# Patient Record
Sex: MALE | Race: WHITE
[De-identification: names, ages, dates, MRNs, and addresses within clinical notes are randomized per-mention and may not be internally consistent; named-entity substitution may affect disease eponyms.]

---

## 2020-04-09 ENCOUNTER — HOSPITAL ENCOUNTER (OUTPATIENT)
Dept: HOSPITAL 53 - M LRY | Age: 40
End: 2020-04-09
Attending: NURSE PRACTITIONER
Payer: COMMERCIAL

## 2020-04-09 ENCOUNTER — HOSPITAL ENCOUNTER (OUTPATIENT)
Dept: HOSPITAL 53 - M SFHCLERA | Age: 40
End: 2020-04-09
Attending: NURSE PRACTITIONER
Payer: COMMERCIAL

## 2020-04-09 DIAGNOSIS — R68.89: Primary | ICD-10-CM

## 2020-04-09 PROCEDURE — 87804 INFLUENZA ASSAY W/OPTIC: CPT

## 2020-04-09 PROCEDURE — 87880 STREP A ASSAY W/OPTIC: CPT

## 2020-04-09 PROCEDURE — 71046 X-RAY EXAM CHEST 2 VIEWS: CPT

## 2020-04-09 NOTE — REP
CHEST, TWO VIEWS:

 

Two views of the chest are performed.

 

There are no prior studies.

 

FINDINGS:

 

There is dense peripheral infiltrate in the right middle lobe. The left lung is

clear. Heart is normal in size. Mediastinal silhouette is unremarkable.

Visualized osseous structures are unremarkable.

 

IMPRESSION:

 

Dense peripheral infiltrate right middle lobe.

 

 

Electronically Signed by

Beto Miller MD 04/10/2020 09:30 A

## 2021-09-06 PROCEDURE — U0003 INFECTIOUS AGENT DETECTION BY NUCLEIC ACID (DNA OR RNA); SEVERE ACUTE RESPIRATORY SYNDROME CORONAVIRUS 2 (SARS-COV-2) (CORONAVIRUS DISEASE [COVID-19]), AMPLIFIED PROBE TECHNIQUE, MAKING USE OF HIGH THROUGHPUT TECHNOLOGIES AS DESCRIBED BY CMS-2020-01-R: HCPCS | Performed by: EMERGENCY MEDICINE

## 2021-09-07 ENCOUNTER — TELEPHONE (OUTPATIENT)
Dept: URGENT CARE | Facility: CLINIC | Age: 41
End: 2021-09-07

## 2021-09-08 ENCOUNTER — TELEPHONE (OUTPATIENT)
Dept: URGENT CARE | Facility: CLINIC | Age: 41
End: 2021-09-08

## 2021-12-21 ENCOUNTER — OFFICE VISIT (OUTPATIENT)
Dept: ORTHOPEDIC SURGERY | Facility: CLINIC | Age: 41
End: 2021-12-21

## 2021-12-21 VITALS — BODY MASS INDEX: 25.18 KG/M2 | WEIGHT: 190 LBS | HEIGHT: 73 IN

## 2021-12-21 DIAGNOSIS — M25.511 RIGHT SHOULDER PAIN, UNSPECIFIED CHRONICITY: Primary | ICD-10-CM

## 2021-12-21 DIAGNOSIS — Z98.890 HISTORY OF ARTHROSCOPY OF RIGHT SHOULDER: ICD-10-CM

## 2021-12-21 PROCEDURE — 99203 OFFICE O/P NEW LOW 30 MIN: CPT | Performed by: ORTHOPAEDIC SURGERY

## 2021-12-21 NOTE — PROGRESS NOTES
"Chief Complaint  Pain of the Right Shoulder     Subjective      Teddy Bonilla presents to [facility] for evaluation of the right shoulder. The patient reports he had a surgery to his right shoulder 1 year ago. He has been attending physical therapy. He has pain and decreased ROM. He reports he had a right shoulder arthroscopy in [place] 12/2021 on his rotator cuff and biceps. He reports a throbbing pain to the anterior shoulder and pain that radiates down his arm. He had a previous surgery in 1999 to his right elbow as well. He has no other complaints.     No Known Allergies     Social History     Socioeconomic History   • Marital status:    Tobacco Use   • Smoking status: Never Smoker   • Smokeless tobacco: Never Used   Vaping Use   • Vaping Use: Never used   Substance and Sexual Activity   • Alcohol use: Yes     Comment: occ   • Drug use: Never        Review of Systems     Objective   Vital Signs:   Ht 185.4 cm (73\")   Wt 86.2 kg (190 lb)   BMI 25.07 kg/m²       Physical Exam  Constitutional:       Appearance: Normal appearance. The patient is well-developed and normal weight.   HENT:      Head: Normocephalic.      Right Ear: Hearing and external ear normal.      Left Ear: Hearing and external ear normal.      Nose: Nose normal.   Eyes:      Conjunctiva/sclera: Conjunctivae normal.   Cardiovascular:      Rate and Rhythm: Normal rate.   Pulmonary:      Effort: Pulmonary effort is normal.      Breath sounds: No wheezing or rales.   Abdominal:      Palpations: Abdomen is soft.      Tenderness: There is no abdominal tenderness.   Musculoskeletal:      Cervical back: Normal range of motion.   Skin:     Findings: No rash.   Neurological:      Mental Status: The patient is alert and oriented to person, place, and time.   Psychiatric:         Mood and Affect: Mood and affect normal.         Judgment: Judgment normal.       Ortho Exam      Right shoulder- well healed arthroscopy " scars and scar in the axillary shoulder from biceps tenodesis. Forward elevation active 165 with pain. Abduction 145. External Rotation 65. Internal rotation 45. 4+ supraspinatus strength. Positive impingement signs. 4+ infraspinatus. 5/5 subscapularis. Internal rotation to T-12. Negative lift off.     Procedures    X-Ray Report:  Right shoulder(s) X-Ray  Indication: Evaluation of right shoulder pain  AP and Lateral view(s)  Findings: postoperative changes around the tuberosity and clavicle resection. No acute fracture.   Prior studies available for comparison: no         Imaging Results (Most Recent)     Procedure Component Value Units Date/Time    XR Scapula Right [[id number]] Resulted: 12/21/21 1337     Updated: 12/21/21 1342           Result Review :       No results found.           Assessment and Plan     DX: Right shoulder pain. History of right shoulder arthroscopy.     Discussed the treatment plan with the patient.  Plan for MRI of the right shoulder to evaluate for internal derangement.     Call or return if worsening symptoms.    Follow Up     After MRI      Patient was given instructions and counseling regarding his condition or for health maintenance advice. Please see specific information pulled into the AVS if appropriate.     Scribed for Christopher Casanova MD by Cynthia Alvarez.  12/21/21   13:46 EST  I have personally performed the services described in this document as scribed by the above individual and it is both accurate and complete. Christopher Casanova MD 12/22/21

## 2022-01-12 ENCOUNTER — HOSPITAL ENCOUNTER (OUTPATIENT)
Dept: MRI IMAGING | Facility: HOSPITAL | Age: 42
Discharge: HOME | End: 2022-01-12
Admitting: ORTHOPAEDIC SURGERY

## 2022-01-12 DIAGNOSIS — M25.511 RIGHT SHOULDER PAIN, UNSPECIFIED CHRONICITY: ICD-10-CM

## 2022-01-12 DIAGNOSIS — Z98.890 HISTORY OF ARTHROSCOPY OF RIGHT SHOULDER: ICD-10-CM

## 2022-01-12 PROCEDURE — 73221 MRI JOINT UPR EXTREM W/O DYE: CPT

## 2022-01-20 ENCOUNTER — OFFICE VISIT (OUTPATIENT)
Dept: ORTHOPEDIC SURGERY | Facility: CLINIC | Age: 42
End: 2022-01-20

## 2022-01-20 VITALS — WEIGHT: 211.2 LBS | BODY MASS INDEX: 27.99 KG/M2 | OXYGEN SATURATION: 97 % | HEART RATE: 57 BPM | HEIGHT: 73 IN

## 2022-01-20 DIAGNOSIS — M75.121 COMPLETE TEAR OF RIGHT ROTATOR CUFF, UNSPECIFIED WHETHER TRAUMATIC: Primary | ICD-10-CM

## 2022-01-20 DIAGNOSIS — S43.431S TEAR OF RIGHT GLENOID LABRUM, SEQUELA: ICD-10-CM

## 2022-01-20 PROBLEM — S43.431A TEAR OF RIGHT GLENOID LABRUM: Status: ACTIVE | Noted: 2022-01-20

## 2022-01-20 PROCEDURE — 99213 OFFICE O/P EST LOW 20 MIN: CPT | Performed by: ORTHOPAEDIC SURGERY

## 2022-01-20 NOTE — PROGRESS NOTES
"Chief Complaint  Pain of the Right Shoulder     Subjective      Teddy Bonilla presents to [facility] for follow up evaluation of the right shoulder. The patient reports he had a surgery to his right shoulder 1 year ago. He has been attending physical therapy. He has pain and decreased ROM. He reports he had a right shoulder arthroscopy in [place] 12/2021 on his rotator cuff and biceps. He reports a throbbing pain to the anterior shoulder and pain that radiates down his arm. He recently had an MRI and is here today for those results.     No Known Allergies     Social History     Socioeconomic History   • Marital status:    Tobacco Use   • Smoking status: Never Smoker   • Smokeless tobacco: Never Used   Vaping Use   • Vaping Use: Never used   Substance and Sexual Activity   • Alcohol use: Yes     Comment: occ   • Drug use: Never        Review of Systems     Objective   Vital Signs:   Pulse 57   Ht 185.4 cm (73\")   Wt 95.8 kg (211 lb 3.2 oz)   SpO2 97%   BMI 27.86 kg/m²       Physical Exam  Constitutional:       Appearance: Normal appearance. The patient is well-developed and normal weight.   HENT:      Head: Normocephalic.      Right Ear: Hearing and external ear normal.      Left Ear: Hearing and external ear normal.      Nose: Nose normal.   Eyes:      Conjunctiva/sclera: Conjunctivae normal.   Cardiovascular:      Rate and Rhythm: Normal rate.   Pulmonary:      Effort: Pulmonary effort is normal.      Breath sounds: No wheezing or rales.   Abdominal:      Palpations: Abdomen is soft.      Tenderness: There is no abdominal tenderness.   Musculoskeletal:      Cervical back: Normal range of motion.   Skin:     Findings: No rash.   Neurological:      Mental Status: The patient is alert and oriented to person, place, and time.   Psychiatric:         Mood and Affect: Mood and affect normal.         Judgment: Judgment normal.       Ortho Exam      Right shoulder- well healed " arthroscopy scars and scar in the axillary shoulder from biceps tenodesis. Forward elevation active 165 with pain. Abduction 145. External Rotation 65. Internal rotation 45. 4+ supraspinatus strength. Positive impingement signs. 4+ infraspinatus. 5/5 subscapularis. Internal rotation to T-12. Negative lift off.     Procedures      Imaging Results (Most Recent)     None           Result Review :       MRI Shoulder Right Without Contrast    Result Date: 1/12/2022  Narrative: PROCEDURE: MRI SHOULDER RIGHT WO CONTRAST  COMPARISON: [facility] , CR, XR SCAPULA RIGHT, 12/21/2021, 13:42.  INDICATIONS: CHRONIC RIGHT SHOULDER PAIN WITH LIMITED ROM      TECHNIQUE: A variety of imaging planes and parameters were utilized for visualization of suspected pathology.  Images were performed without contrast.   FINDINGS:  Motion artifact limits the evaluation.   There is been a previous rotator cuff tear repair.  No acute fracture or malalignment is demonstrated.  There appears to have been previous resection of the distal clavicle.  There is a full-thickness tear involving the posterior fibers of the supraspinatus and anterior fibers of the infra spinatus.  The tear measures 2.5 cm AP with approximately 2.6 cm tendon retraction.  No muscle body atrophy is seen.  The rotator cuff otherwise appears unremarkable.  A biceps tenodesis has been performed.  There is separation of the anterosuperior labrum from the underlying glenoid consistent with a sub labral foramen.  The entire posterior labrum appears considerably diminutive and irregular which may be secondary to chronic degeneration/tearing and or of previous labral debridement intermediate signal abnormality in the anteroinferior labrum is favored to be degenerative in etiology.  No glenohumeral joint effusion is seen.  Cartilage in the glenohumeral joint is moderately thinned.  No loose body is seen.      Impression:   1. Previous rotator cuff tear repair, biceps tenodesis  and resection of the distal clavicle 2. Full-thickness tear involving the supraspinatus and infra spinatus tendons, as above 3. Diminutive , irregular appearance of the entire posterior labrum which may be secondary to chronic degeneration/tearing and or previous labral debridement.      Boogie Liriano M.D.       Electronically Signed and Approved By: Boogie Liriano M.D. on 1/12/2022 at 16:35                   Assessment and Plan     DX: Right rotator cuff tear, labral tear    Discussed the treatment plan with the patient. He has a complex problem with a retear of previous rotator cuff repair with a large retracted tear. Recommend evaluation by revision specialist due to possibility of tendon transfer or SCR being a possibility if primary repair of RTC is not possible.  Plan for a referral to the [facility] or [facility] if needed.  Advised him to contact his PCP for the referral.     Call or return if worsening symptoms.    Follow Up     PRN       Patient was given instructions and counseling regarding his condition or for health maintenance advice. Please see specific information pulled into the AVS if appropriate.     Scribed for Christopher Casanova MD by Cynthia Alvarez.  01/20/22   15:32 EST